# Patient Record
Sex: MALE | Race: WHITE | URBAN - METROPOLITAN AREA
[De-identification: names, ages, dates, MRNs, and addresses within clinical notes are randomized per-mention and may not be internally consistent; named-entity substitution may affect disease eponyms.]

---

## 2023-10-30 ENCOUNTER — LAB OUTSIDE AN ENCOUNTER (OUTPATIENT)
Dept: URBAN - METROPOLITAN AREA CLINIC 68 | Facility: CLINIC | Age: 68
End: 2023-10-30

## 2023-10-30 ENCOUNTER — OFFICE VISIT (OUTPATIENT)
Dept: URBAN - METROPOLITAN AREA CLINIC 68 | Facility: CLINIC | Age: 68
End: 2023-10-30
Payer: MEDICARE

## 2023-10-30 ENCOUNTER — DASHBOARD ENCOUNTERS (OUTPATIENT)
Age: 68
End: 2023-10-30

## 2023-10-30 VITALS
WEIGHT: 247 LBS | OXYGEN SATURATION: 97 % | HEART RATE: 86 BPM | BODY MASS INDEX: 33.46 KG/M2 | DIASTOLIC BLOOD PRESSURE: 80 MMHG | HEIGHT: 72 IN | SYSTOLIC BLOOD PRESSURE: 138 MMHG

## 2023-10-30 DIAGNOSIS — K21.9 GASTROESOPHAGEAL REFLUX DISEASE WITHOUT ESOPHAGITIS: ICD-10-CM

## 2023-10-30 PROBLEM — 266435005: Status: ACTIVE | Noted: 2023-10-30

## 2023-10-30 PROCEDURE — 99204 OFFICE O/P NEW MOD 45 MIN: CPT | Performed by: SPECIALIST

## 2023-10-30 RX ORDER — GABAPENTIN 300 MG/1
TAKE ONE CAPSULE BY MOUTH THREE TIMES A DAY CAPSULE ORAL
Qty: 60 UNSPECIFIED | Refills: 1 | Status: ACTIVE | COMMUNITY

## 2023-10-30 RX ORDER — ASPIRIN 81 MG/1
TAKE 1 TABLET BY ORAL ROUTE 3 TIMES A DAY TABLET, COATED ORAL
Qty: 0 | Refills: 0 | Status: ACTIVE | COMMUNITY
Start: 1900-01-01

## 2023-10-30 RX ORDER — ATORVASTATIN CALCIUM 40 MG/1
1 TABLET TABLET, FILM COATED ORAL ONCE A DAY
Qty: 90 TABLET | Status: ACTIVE | COMMUNITY

## 2023-10-30 RX ORDER — SEMAGLUTIDE 0.68 MG/ML
INJECT 0.25MG UNDER THE SKIN EVERY WEEK INJECTION, SOLUTION SUBCUTANEOUS
Qty: 3 UNSPECIFIED | Refills: 1 | Status: ON HOLD | COMMUNITY

## 2023-10-30 RX ORDER — METOPROLOL TARTRATE 25 MG/1
TABLET, FILM COATED ORAL
Qty: 0 | Refills: 0 | Status: ACTIVE | COMMUNITY
Start: 1900-01-01

## 2023-10-30 RX ORDER — METHYLPREDNISOLONE 4 MG/1
FOLLOW THE DIRECTIONS LISTED ON THE LABEL OR PROVIDED BY YOUR PHYSICIAN OR PHARMACIST TABLET ORAL
Qty: 21 UNSPECIFIED | Refills: 0 | Status: ON HOLD | COMMUNITY

## 2023-10-30 RX ORDER — LANSOPRAZOLE 30 MG/1
1 CAPSULE BEFORE A MEAL CAPSULE, DELAYED RELEASE ORAL ONCE A DAY
Qty: 30 | Refills: 11 | OUTPATIENT
Start: 2023-10-30

## 2023-10-30 RX ORDER — ALIROCUMAB 75 MG/ML
INJECT THE CONTENTS OF ONE PEN UNDER THE SKIN EVERY 14 (FOURTEEN) DAYS INJECTION, SOLUTION SUBCUTANEOUS
Qty: 2 UNSPECIFIED | Refills: 8 | Status: ACTIVE | COMMUNITY

## 2023-10-30 RX ORDER — MUPIROCIN 20 MG/G
APPLY TOPICALLY TO SURGICAL SITE ONCE DAILY WITH BANDAGE CHANGES UNTIL HEALED OINTMENT TOPICAL
Qty: 22 UNSPECIFIED | Refills: 0 | Status: ON HOLD | COMMUNITY

## 2023-11-28 ENCOUNTER — OFFICE VISIT (OUTPATIENT)
Dept: URBAN - METROPOLITAN AREA SURGERY CENTER 12 | Facility: SURGERY CENTER | Age: 68
End: 2023-11-28

## 2025-05-19 ENCOUNTER — OFFICE VISIT (OUTPATIENT)
Dept: URBAN - METROPOLITAN AREA CLINIC 68 | Facility: CLINIC | Age: 70
End: 2025-05-19

## 2025-05-19 ENCOUNTER — LAB OUTSIDE AN ENCOUNTER (OUTPATIENT)
Dept: URBAN - METROPOLITAN AREA CLINIC 68 | Facility: CLINIC | Age: 70
End: 2025-05-19

## 2025-05-19 RX ORDER — HYDROCHLOROTHIAZIDE 25 MG/1
1 TABLET IN THE MORNING TABLET ORAL ONCE A DAY
Status: ACTIVE | COMMUNITY

## 2025-05-19 RX ORDER — METOPROLOL TARTRATE 25 MG/1
TABLET, FILM COATED ORAL
Qty: 0 | Refills: 0 | Status: ON HOLD | COMMUNITY
Start: 1900-01-01

## 2025-05-19 RX ORDER — ALIROCUMAB 75 MG/ML
INJECT THE CONTENTS OF ONE PEN UNDER THE SKIN EVERY 14 (FOURTEEN) DAYS INJECTION, SOLUTION SUBCUTANEOUS
Qty: 2 UNSPECIFIED | Refills: 8 | Status: ACTIVE | COMMUNITY

## 2025-05-19 RX ORDER — ATORVASTATIN CALCIUM 40 MG/1
1 TABLET TABLET, FILM COATED ORAL ONCE A DAY
Qty: 90 TABLET | Status: ACTIVE | COMMUNITY

## 2025-05-19 RX ORDER — SEMAGLUTIDE 0.68 MG/ML
INJECT 0.25MG UNDER THE SKIN EVERY WEEK INJECTION, SOLUTION SUBCUTANEOUS
Qty: 3 UNSPECIFIED | Refills: 1 | Status: ON HOLD | COMMUNITY

## 2025-05-19 RX ORDER — LANSOPRAZOLE 30 MG/1
1 CAPSULE BEFORE A MEAL CAPSULE, DELAYED RELEASE ORAL ONCE A DAY
Qty: 30 | Refills: 11 | Status: ON HOLD | COMMUNITY
Start: 2023-10-30

## 2025-05-19 RX ORDER — SODIUM SULFATE, MAGNESIUM SULFATE, AND POTASSIUM CHLORIDE 17.75; 2.7; 2.25 G/1; G/1; G/1
12 TABLETS THE FIRST DOSE THE EVENING BEFORE AND SECOND DOSE THE MORNING OF COLONOSCOPY TABLET ORAL TWICE A DAY
Qty: 24 | Refills: 0 | OUTPATIENT
Start: 2025-05-19 | End: 2025-05-20

## 2025-05-19 RX ORDER — METHYLPREDNISOLONE 4 MG/1
FOLLOW THE DIRECTIONS LISTED ON THE LABEL OR PROVIDED BY YOUR PHYSICIAN OR PHARMACIST TABLET ORAL
Qty: 21 UNSPECIFIED | Refills: 0 | Status: ON HOLD | COMMUNITY

## 2025-05-19 RX ORDER — ASPIRIN 81 MG/1
TAKE 1 TABLET BY ORAL ROUTE 3 TIMES A DAY TABLET, COATED ORAL
Qty: 0 | Refills: 0 | Status: ACTIVE | COMMUNITY
Start: 1900-01-01

## 2025-05-19 RX ORDER — MUPIROCIN 20 MG/G
APPLY TOPICALLY TO SURGICAL SITE ONCE DAILY WITH BANDAGE CHANGES UNTIL HEALED OINTMENT TOPICAL
Qty: 22 UNSPECIFIED | Refills: 0 | Status: ON HOLD | COMMUNITY

## 2025-05-19 RX ORDER — GABAPENTIN 300 MG/1
TAKE ONE CAPSULE BY MOUTH THREE TIMES A DAY CAPSULE ORAL
Qty: 60 UNSPECIFIED | Refills: 1 | Status: ACTIVE | COMMUNITY

## 2025-05-19 NOTE — HPI-TODAY'S VISIT:
This patient developed new symptoms of substernal dyspepsia burping belching and discomfort.  He began airplane trip returning from [place].  He did not drink excess alcohol.  He did not get sick with diarrhea or any other symptoms.  He did not have any definite dysphagia.  Symptoms however are new and bothersome and kept him up all night.  He started Prevacid over-the-counter 1 daily and his symptoms have improved.He has no prior history of reflux disease dysphagia or other GI problems  In the past he had colonoscopy for colon cancer surveillance Currently he is improved with no severe pain melena or rectal bleeding continuing on Prevacid daily IMPRESSIONNew onset GERD symptoms rule out esophagitis pill esophagitis or benign or malignant upper GI pathology causing new change in symptoms PLANContinue Prevacid 30 mg dailyDiagnostic upper endoscopyWeight loss antireflux recommendations were madeGreenTesting test and then it keeps dictating and looks pretty good however 3 to transfer that did not the text if I try another window or if I closed the testing dictating   another box pops up inIf this which goes to the previous study GreenTesting test and then it keeps dictating and looks pretty good however 3 to transfer that did not the text if I try another window or if I closed the testing dictating

## 2025-05-22 ENCOUNTER — WEB ENCOUNTER (OUTPATIENT)
Dept: URBAN - METROPOLITAN AREA CLINIC 68 | Facility: CLINIC | Age: 70
End: 2025-05-22

## 2025-06-10 ENCOUNTER — CLAIMS CREATED FROM THE CLAIM WINDOW (OUTPATIENT)
Dept: URBAN - METROPOLITAN AREA SURGERY CENTER 12 | Facility: SURGERY CENTER | Age: 70
End: 2025-06-10
Payer: MEDICARE

## 2025-06-10 ENCOUNTER — CLAIMS CREATED FROM THE CLAIM WINDOW (OUTPATIENT)
Dept: URBAN - METROPOLITAN AREA CLINIC 4 | Facility: CLINIC | Age: 70
End: 2025-06-10
Payer: MEDICARE

## 2025-06-10 DIAGNOSIS — K57.30 DIVERTICULOSIS OF LARGE INTESTINE WITHOUT PERFORATION OR ABSCESS WITHOUT BLEEDING: ICD-10-CM

## 2025-06-10 DIAGNOSIS — K63.5 COLON POLYP: ICD-10-CM

## 2025-06-10 DIAGNOSIS — Z86.0100 PERSONAL HISTORY OF COLON POLYPS, UNSPECIFIED: ICD-10-CM

## 2025-06-10 DIAGNOSIS — G47.30 SLEEP APNEA, UNSPECIFIED TYPE: ICD-10-CM

## 2025-06-10 DIAGNOSIS — Z86.0100 PERSONAL HISTORY OF COLONIC POLYPS: ICD-10-CM

## 2025-06-10 DIAGNOSIS — K62.3 RECTAL PROLAPSE: ICD-10-CM

## 2025-06-10 DIAGNOSIS — Z09 ENCOUNTER FOR FOLLOW-UP EXAMINATION AFTER COMPLETED TREATMENT FOR CONDITIONS OTHER THAN MALIGNANT NEOPLASM: ICD-10-CM

## 2025-06-10 PROCEDURE — 88305 TISSUE EXAM BY PATHOLOGIST: CPT | Performed by: PATHOLOGY

## 2025-06-10 PROCEDURE — 0529F INTRVL 3/>YR PTS CLNSCP DOCD: CPT | Performed by: SPECIALIST

## 2025-06-10 PROCEDURE — 45380 COLONOSCOPY AND BIOPSY: CPT | Performed by: CLINIC/CENTER

## 2025-06-10 PROCEDURE — 45380 COLONOSCOPY AND BIOPSY: CPT | Performed by: SPECIALIST

## 2025-06-10 PROCEDURE — 00811 ANES LWR INTST NDSC NOS: CPT | Performed by: NURSE ANESTHETIST, CERTIFIED REGISTERED

## 2025-06-10 PROCEDURE — 0529F INTRVL 3/>YR PTS CLNSCP DOCD: CPT | Performed by: CLINIC/CENTER

## 2025-06-10 RX ORDER — GABAPENTIN 300 MG/1
TAKE ONE CAPSULE BY MOUTH THREE TIMES A DAY CAPSULE ORAL
Qty: 60 UNSPECIFIED | Refills: 1 | Status: ACTIVE | COMMUNITY

## 2025-06-10 RX ORDER — ALIROCUMAB 75 MG/ML
INJECT THE CONTENTS OF ONE PEN UNDER THE SKIN EVERY 14 (FOURTEEN) DAYS INJECTION, SOLUTION SUBCUTANEOUS
Qty: 2 UNSPECIFIED | Refills: 8 | Status: ACTIVE | COMMUNITY

## 2025-06-10 RX ORDER — LANSOPRAZOLE 30 MG/1
1 CAPSULE BEFORE A MEAL CAPSULE, DELAYED RELEASE ORAL ONCE A DAY
Qty: 30 | Refills: 11 | Status: ON HOLD | COMMUNITY
Start: 2023-10-30

## 2025-06-10 RX ORDER — SEMAGLUTIDE 0.68 MG/ML
INJECT 0.25MG UNDER THE SKIN EVERY WEEK INJECTION, SOLUTION SUBCUTANEOUS
Qty: 3 UNSPECIFIED | Refills: 1 | Status: ON HOLD | COMMUNITY

## 2025-06-10 RX ORDER — ASPIRIN 81 MG/1
TAKE 1 TABLET BY ORAL ROUTE 3 TIMES A DAY TABLET, COATED ORAL
Qty: 0 | Refills: 0 | Status: ACTIVE | COMMUNITY
Start: 1900-01-01

## 2025-06-10 RX ORDER — METOPROLOL TARTRATE 25 MG/1
TABLET, FILM COATED ORAL
Qty: 0 | Refills: 0 | Status: ON HOLD | COMMUNITY
Start: 1900-01-01

## 2025-06-10 RX ORDER — METHYLPREDNISOLONE 4 MG/1
FOLLOW THE DIRECTIONS LISTED ON THE LABEL OR PROVIDED BY YOUR PHYSICIAN OR PHARMACIST TABLET ORAL
Qty: 21 UNSPECIFIED | Refills: 0 | Status: ON HOLD | COMMUNITY

## 2025-06-10 RX ORDER — HYDROCHLOROTHIAZIDE 25 MG/1
1 TABLET IN THE MORNING TABLET ORAL ONCE A DAY
Status: ACTIVE | COMMUNITY

## 2025-06-10 RX ORDER — ATORVASTATIN CALCIUM 40 MG/1
1 TABLET TABLET, FILM COATED ORAL ONCE A DAY
Qty: 90 TABLET | Status: ACTIVE | COMMUNITY

## 2025-06-10 RX ORDER — MUPIROCIN 20 MG/G
APPLY TOPICALLY TO SURGICAL SITE ONCE DAILY WITH BANDAGE CHANGES UNTIL HEALED OINTMENT TOPICAL
Qty: 22 UNSPECIFIED | Refills: 0 | Status: ON HOLD | COMMUNITY

## 2025-06-30 ENCOUNTER — TELEPHONE ENCOUNTER (OUTPATIENT)
Dept: URBAN - METROPOLITAN AREA CLINIC 68 | Facility: CLINIC | Age: 70
End: 2025-06-30

## 2025-07-16 ENCOUNTER — OFFICE VISIT (OUTPATIENT)
Dept: URBAN - METROPOLITAN AREA CLINIC 68 | Facility: CLINIC | Age: 70
End: 2025-07-16

## 2025-07-16 RX ORDER — SEMAGLUTIDE 0.68 MG/ML
INJECT 0.25MG UNDER THE SKIN EVERY WEEK INJECTION, SOLUTION SUBCUTANEOUS
Qty: 3 UNSPECIFIED | Refills: 1 | Status: ON HOLD | COMMUNITY

## 2025-07-16 RX ORDER — ASPIRIN 81 MG/1
TAKE 1 TABLET BY ORAL ROUTE 3 TIMES A DAY TABLET, COATED ORAL
Qty: 0 | Refills: 0 | Status: ACTIVE | COMMUNITY
Start: 1900-01-01

## 2025-07-16 RX ORDER — MUPIROCIN 20 MG/G
APPLY TOPICALLY TO SURGICAL SITE ONCE DAILY WITH BANDAGE CHANGES UNTIL HEALED OINTMENT TOPICAL
Qty: 22 UNSPECIFIED | Refills: 0 | Status: ON HOLD | COMMUNITY

## 2025-07-16 RX ORDER — LANSOPRAZOLE 30 MG/1
1 CAPSULE BEFORE A MEAL CAPSULE, DELAYED RELEASE ORAL ONCE A DAY
Qty: 30 | Refills: 11 | Status: ON HOLD | COMMUNITY
Start: 2023-10-30

## 2025-07-16 RX ORDER — METHYLPREDNISOLONE 4 MG/1
FOLLOW THE DIRECTIONS LISTED ON THE LABEL OR PROVIDED BY YOUR PHYSICIAN OR PHARMACIST TABLET ORAL
Qty: 21 UNSPECIFIED | Refills: 0 | Status: ON HOLD | COMMUNITY

## 2025-07-16 RX ORDER — ALIROCUMAB 75 MG/ML
INJECT THE CONTENTS OF ONE PEN UNDER THE SKIN EVERY 14 (FOURTEEN) DAYS INJECTION, SOLUTION SUBCUTANEOUS
Qty: 2 UNSPECIFIED | Refills: 8 | Status: ACTIVE | COMMUNITY

## 2025-07-16 RX ORDER — GABAPENTIN 300 MG/1
TAKE ONE CAPSULE BY MOUTH THREE TIMES A DAY CAPSULE ORAL
Qty: 60 UNSPECIFIED | Refills: 1 | Status: ACTIVE | COMMUNITY

## 2025-07-16 RX ORDER — HYDROCHLOROTHIAZIDE 25 MG/1
1 TABLET IN THE MORNING TABLET ORAL ONCE A DAY
Status: ACTIVE | COMMUNITY

## 2025-07-16 RX ORDER — METOPROLOL TARTRATE 25 MG/1
TABLET, FILM COATED ORAL
Qty: 0 | Refills: 0 | Status: ON HOLD | COMMUNITY
Start: 1900-01-01

## 2025-07-16 RX ORDER — ATORVASTATIN CALCIUM 40 MG/1
1 TABLET TABLET, FILM COATED ORAL ONCE A DAY
Qty: 90 TABLET | Status: ACTIVE | COMMUNITY

## 2025-07-16 NOTE — HPI-TODAY'S VISIT:
5/19 PRIOR This patient developed new symptoms of substernal dyspepsia burping belching and discomfort.  He began airplane trip returning from [place].  He did not drink excess alcohol.  He did not get sick with diarrhea or any other symptoms.  He did not have any definite dysphagia.  Symptoms however are new and bothersome and kept him up all night.  He started Prevacid over-the-counter 1 daily and his symptoms have improved.He has no prior history of reflux disease dysphagia or other GI problems  In the past he had colonoscopy for colon cancer surveillance Currently he is improved with no severe pain melena or rectal bleeding continuing on Prevacid daily IMPRESSION New onset GERD symptoms rule out esophagitis pill esophagitis or benign or malignant upper GI pathology causing new change in symptoms PLAN Continue Prevacid 30 mg daily